# Patient Record
Sex: FEMALE | Race: WHITE | Employment: FULL TIME | URBAN - METROPOLITAN AREA
[De-identification: names, ages, dates, MRNs, and addresses within clinical notes are randomized per-mention and may not be internally consistent; named-entity substitution may affect disease eponyms.]

---

## 2022-07-20 ENCOUNTER — APPOINTMENT (OUTPATIENT)
Dept: GENERAL RADIOLOGY | Age: 37
End: 2022-07-20
Attending: EMERGENCY MEDICINE
Payer: MEDICAID

## 2022-07-20 ENCOUNTER — HOSPITAL ENCOUNTER (EMERGENCY)
Age: 37
Discharge: HOME | End: 2022-07-20
Attending: EMERGENCY MEDICINE
Payer: MEDICAID

## 2022-07-20 VITALS
HEIGHT: 67 IN | TEMPERATURE: 98.9 F | OXYGEN SATURATION: 98 % | DIASTOLIC BLOOD PRESSURE: 71 MMHG | WEIGHT: 200 LBS | HEART RATE: 98 BPM | BODY MASS INDEX: 31.39 KG/M2 | RESPIRATION RATE: 16 BRPM | SYSTOLIC BLOOD PRESSURE: 128 MMHG

## 2022-07-20 DIAGNOSIS — S83.92XA SPRAIN OF LEFT KNEE, UNSPECIFIED LIGAMENT, INITIAL ENCOUNTER: Primary | ICD-10-CM

## 2022-07-20 PROCEDURE — 74011250637 HC RX REV CODE- 250/637: Performed by: EMERGENCY MEDICINE

## 2022-07-20 PROCEDURE — 74011250636 HC RX REV CODE- 250/636: Performed by: EMERGENCY MEDICINE

## 2022-07-20 PROCEDURE — 96372 THER/PROPH/DIAG INJ SC/IM: CPT

## 2022-07-20 PROCEDURE — 99284 EMERGENCY DEPT VISIT MOD MDM: CPT

## 2022-07-20 PROCEDURE — 73562 X-RAY EXAM OF KNEE 3: CPT

## 2022-07-20 RX ORDER — IBUPROFEN 800 MG/1
800 TABLET ORAL
Qty: 20 TABLET | Refills: 0 | Status: SHIPPED | OUTPATIENT
Start: 2022-07-20 | End: 2022-07-27

## 2022-07-20 RX ORDER — ACETAMINOPHEN 500 MG
1000 TABLET ORAL ONCE
Status: COMPLETED | OUTPATIENT
Start: 2022-07-20 | End: 2022-07-20

## 2022-07-20 RX ORDER — KETOROLAC TROMETHAMINE 30 MG/ML
60 INJECTION, SOLUTION INTRAMUSCULAR; INTRAVENOUS
Status: COMPLETED | OUTPATIENT
Start: 2022-07-20 | End: 2022-07-20

## 2022-07-20 RX ADMIN — ACETAMINOPHEN 1000 MG: 500 TABLET ORAL at 21:50

## 2022-07-20 RX ADMIN — KETOROLAC TROMETHAMINE 60 MG: 30 INJECTION, SOLUTION INTRAMUSCULAR; INTRAVENOUS at 21:50

## 2022-09-23 ENCOUNTER — HOSPITAL ENCOUNTER (EMERGENCY)
Age: 37
Discharge: HOME | End: 2022-09-23
Attending: EMERGENCY MEDICINE
Payer: MEDICAID

## 2022-09-23 VITALS
DIASTOLIC BLOOD PRESSURE: 96 MMHG | OXYGEN SATURATION: 100 % | WEIGHT: 190 LBS | BODY MASS INDEX: 29.82 KG/M2 | HEART RATE: 97 BPM | TEMPERATURE: 98.4 F | RESPIRATION RATE: 18 BRPM | SYSTOLIC BLOOD PRESSURE: 137 MMHG | HEIGHT: 67 IN

## 2022-09-23 DIAGNOSIS — J06.9 ACUTE UPPER RESPIRATORY INFECTION: Primary | ICD-10-CM

## 2022-09-23 LAB
FLUAV AG NPH QL IA: NEGATIVE
FLUBV AG NOSE QL IA: NEGATIVE
SARS-COV-2, COV2: NORMAL

## 2022-09-23 PROCEDURE — 99283 EMERGENCY DEPT VISIT LOW MDM: CPT

## 2022-09-23 PROCEDURE — U0005 INFEC AGEN DETEC AMPLI PROBE: HCPCS

## 2022-09-23 PROCEDURE — 74011636637 HC RX REV CODE- 636/637: Performed by: EMERGENCY MEDICINE

## 2022-09-23 PROCEDURE — 87804 INFLUENZA ASSAY W/OPTIC: CPT

## 2022-09-23 RX ORDER — ALBUTEROL SULFATE 90 UG/1
1 AEROSOL, METERED RESPIRATORY (INHALATION)
Qty: 6.7 G | Refills: 1 | Status: SHIPPED | OUTPATIENT
Start: 2022-09-23

## 2022-09-23 RX ORDER — PREDNISONE 20 MG/1
TABLET ORAL
Qty: 12 TABLET | Refills: 0 | Status: SHIPPED | OUTPATIENT
Start: 2022-09-23

## 2022-09-23 RX ORDER — PREDNISONE 20 MG/1
60 TABLET ORAL ONCE
Status: CANCELLED | OUTPATIENT
Start: 2022-09-23 | End: 2022-09-23

## 2022-09-23 RX ORDER — PREDNISONE 20 MG/1
60 TABLET ORAL ONCE
Status: COMPLETED | OUTPATIENT
Start: 2022-09-23 | End: 2022-09-23

## 2022-09-23 RX ORDER — AZITHROMYCIN 250 MG/1
TABLET, FILM COATED ORAL
Qty: 6 TABLET | Refills: 0 | Status: SHIPPED | OUTPATIENT
Start: 2022-09-23

## 2022-09-23 RX ADMIN — PREDNISONE 60 MG: 20 TABLET ORAL at 21:17

## 2022-09-24 NOTE — ED TRIAGE NOTES
Pt states sore throat, nasal congestion and cough x 2 days, reports annual URI, however \"children tested positive for flu last week\"

## 2022-09-24 NOTE — ED PROVIDER NOTES
EMERGENCY DEPARTMENT HISTORY AND PHYSICAL EXAM      Date: 9/23/2022  Patient Name: Gideon Rubin    History of Presenting Illness     Chief Complaint   Patient presents with    Cough    Sore Throat    Nasal Congestion       History Provided By: Patient    HPI: Gideon Rubin, 31 y.o. female   presents to the ED with cc of URI symptoms. Patient complains of nasal congestion and sore throat along with a productive cough of yellow sputum for last 2 days. Patient states that she was recently exposed by a family member with a positive \"flu\". No fever chills. No shortness of breath. No vomiting or diarrhea. No headache. No body ache. No obvious exposure to COVID-19. No OTC treatment. PCP: Michael Ghosh MD    No current facility-administered medications on file prior to encounter. No current outpatient medications on file prior to encounter. Past History     Past Medical History:  Past Medical History:   Diagnosis Date    History of seasonal allergies        Past Surgical History:  History reviewed. No pertinent surgical history. Family History:  History reviewed. No pertinent family history. Social History:  Social History     Tobacco Use    Smoking status: Every Day     Packs/day: 0.50     Types: Cigarettes    Smokeless tobacco: Never   Vaping Use    Vaping Use: Never used   Substance Use Topics    Drug use: Not Currently       Allergies:  No Known Allergies      Review of Systems   Review of Systems   Constitutional:  Negative for chills and fever. HENT:  Positive for rhinorrhea and sore throat. Eyes:  Negative for discharge. Respiratory:  Positive for cough. Negative for shortness of breath. Cardiovascular:  Negative for chest pain. Gastrointestinal:  Negative for abdominal pain and vomiting. Genitourinary:  Negative for dysuria. Musculoskeletal:  Negative for joint swelling. Skin:  Negative for rash. Neurological:  Negative for headaches.    Psychiatric/Behavioral: Negative for suicidal ideas. All other systems reviewed and are negative. Physical Exam   Physical Exam  Vitals and nursing note reviewed. Constitutional:       General: She is not in acute distress. Appearance: Normal appearance. She is well-developed. She is not ill-appearing or toxic-appearing. HENT:      Head: Normocephalic and atraumatic. Nose: No congestion. Mouth/Throat:      Mouth: Mucous membranes are moist.      Pharynx: Posterior oropharyngeal erythema present. No oropharyngeal exudate. Comments: No tonsillar enlargement. Uvula midline. No muffled voice. No stridor. Eyes:      Conjunctiva/sclera: Conjunctivae normal.   Cardiovascular:      Rate and Rhythm: Regular rhythm. Heart sounds: Normal heart sounds. Pulmonary:      Effort: Pulmonary effort is normal.      Breath sounds: Normal breath sounds. No wheezing or rhonchi. Abdominal:      General: Bowel sounds are normal.      Palpations: Abdomen is soft. Tenderness: There is no abdominal tenderness. Musculoskeletal:         General: No deformity. Cervical back: Neck supple. No rigidity. Lymphadenopathy:      Cervical: No cervical adenopathy. Skin:     General: Skin is warm and dry. Neurological:      General: No focal deficit present. Mental Status: She is alert.    Psychiatric:         Mood and Affect: Mood normal.       Diagnostic Study Results     Labs -     Recent Results (from the past 12 hour(s))   SARS-COV-2    Collection Time: 09/23/22  8:15 PM   Result Value Ref Range    SARS-CoV-2 by PCR Please find results under separate order     INFLUENZA A & B AG (RAPID TEST)    Collection Time: 09/23/22  8:15 PM   Result Value Ref Range    Influenza A Antigen Negative Negative      Influenza B Antigen Negative Negative         Radiologic Studies -   No orders to display     CT Results  (Last 48 hours)      None          CXR Results  (Last 48 hours)      None              Medical Decision Making I am the first provider for this patient. I reviewed the vital signs, available nursing notes, past medical history, past surgical history, family history and social history. Vital Signs-Reviewed the patient's vital signs. Patient Vitals for the past 12 hrs:   Temp Pulse Resp BP SpO2   09/23/22 2007 98.4 °F (36.9 °C) 97 18 (!) 137/96 100 %       Records Reviewed:     Provider Notes (Medical Decision Making):       ED Course:   Initial assessment performed. The patients presenting problems have been discussed, and they are in agreement with the care plan formulated and outlined with them. I have encouraged them to ask questions as they arise throughout their visit. PROCEDURES      Disposition: Condition stable   DC- Adult Discharges: All of the diagnostic tests were reviewed and questions answered. Diagnosis, care plan and treatment options were discussed. understand instructions and will follow up as directed. The patients results have been reviewed with them. They have been counseled regarding their diagnosis. The patient verbally convey understanding and agreement of the signs, symptoms, diagnosis, treatment and prognosis and additionally agrees to follow up as recommended. They also agree with the care-plan and convey that all of their questions have been answered. I have also put together some discharge instructions for them that include: 1) educational information regarding their diagnosis, 2) how to care for their diagnosis at home, as well a 3) list of reasons why they would want to return to the ED prior to their follow-up appointment, should their condition change. PLAN:  1. Current Discharge Medication List        START taking these medications    Details   guaiFENesin-dextromethorphan SR (Mucinex DM) 600-30 mg per tablet Take 1 Tablet by mouth two (2) times a day.   Qty: 12 Tablet, Refills: 0  Start date: 9/23/2022      predniSONE (DELTASONE) 20 mg tablet 3 tablets for 2 days then 2 tablets for 2 days then 1 tablet for 2 day  Qty: 12 Tablet, Refills: 0  Start date: 9/23/2022      azithromycin (Zithromax Z-Jamison) 250 mg tablet As instructed in the pack  Qty: 6 Tablet, Refills: 0  Start date: 9/23/2022      albuterol (PROVENTIL HFA, VENTOLIN HFA, PROAIR HFA) 90 mcg/actuation inhaler Take 1 Puff by inhalation every four (4) hours as needed for Wheezing. Qty: 6.7 g, Refills: 1  Start date: 9/23/2022           2. Follow-up Information       Follow up With Specialties Details Why Contact Info    Follow up with your primary care physician  Schedule an appointment as soon as possible for a visit in 3 days As needed           Return to ED if worse     Diagnosis     Clinical Impression:   1. Acute upper respiratory infection        Please note that this dictation was completed with Edvert, the computer voice recognition software. Quite often unanticipated grammatical, syntax, homophones, and other interpretive errors are inadvertently transcribed by the computer software. Please disregard these errors. Please excuse any errors that have escaped final proofreading. Thank you.

## 2022-09-25 LAB
SARS-COV-2, XPLCVT: NOT DETECTED
SOURCE, COVRS: NORMAL

## 2022-11-02 ENCOUNTER — HOSPITAL ENCOUNTER (EMERGENCY)
Age: 37
Discharge: HOME | End: 2022-11-02
Attending: EMERGENCY MEDICINE
Payer: MEDICAID

## 2022-11-02 VITALS
TEMPERATURE: 99.2 F | SYSTOLIC BLOOD PRESSURE: 113 MMHG | OXYGEN SATURATION: 97 % | DIASTOLIC BLOOD PRESSURE: 67 MMHG | WEIGHT: 190 LBS | RESPIRATION RATE: 18 BRPM | HEART RATE: 109 BPM | HEIGHT: 67 IN | BODY MASS INDEX: 29.82 KG/M2

## 2022-11-02 DIAGNOSIS — B34.9 VIRAL SYNDROME: Primary | ICD-10-CM

## 2022-11-02 DIAGNOSIS — J02.9 SORE THROAT: ICD-10-CM

## 2022-11-02 DIAGNOSIS — M79.10 MYALGIA: ICD-10-CM

## 2022-11-02 LAB
DEPRECATED S PYO AG THROAT QL EIA: NEGATIVE
FLUAV AG NPH QL IA: NEGATIVE
FLUBV AG NOSE QL IA: NEGATIVE
SARS-COV-2, COV2: NORMAL

## 2022-11-02 PROCEDURE — U0005 INFEC AGEN DETEC AMPLI PROBE: HCPCS

## 2022-11-02 PROCEDURE — 87880 STREP A ASSAY W/OPTIC: CPT

## 2022-11-02 PROCEDURE — 87804 INFLUENZA ASSAY W/OPTIC: CPT

## 2022-11-02 PROCEDURE — 87070 CULTURE OTHR SPECIMN AEROBIC: CPT

## 2022-11-02 PROCEDURE — 99283 EMERGENCY DEPT VISIT LOW MDM: CPT

## 2022-11-02 NOTE — ED PROVIDER NOTES
EMERGENCY DEPARTMENT HISTORY AND PHYSICAL EXAM      Date: 11/2/2022  Patient Name: Jenny Nguyen    History of Presenting Illness     Chief Complaint   Patient presents with    Sore Throat       History Provided By: Patient    HPI: Jenny Nguyen, 30 y.o. female presents to the ED with CC of fever, chills, sore throat and sick partner with similar symptoms. Patient denies SOB, chest pain, or any neurological symptoms. There are no other complaints, changes, or physical findings at this time. Past History     Past Medical History:  Past Medical History:   Diagnosis Date    History of seasonal allergies        Allergies:  No Known Allergies    Review of Systems   Vital signs and nursing notes reviewed  Review of Systems   Constitutional:  Positive for chills and fever. Negative for activity change, appetite change and fatigue. HENT:  Positive for sore throat. Negative for congestion and rhinorrhea. Respiratory: Negative. Negative for cough, shortness of breath and wheezing. Cardiovascular: Negative. Negative for chest pain and leg swelling. Gastrointestinal: Negative. Negative for abdominal pain, diarrhea, nausea and vomiting. Endocrine: Negative. Genitourinary:  Negative for dysuria, frequency and urgency. Musculoskeletal: Negative. Negative for arthralgias, joint swelling and myalgias. Skin: Negative. Negative for rash. Neurological: Negative. Negative for dizziness, weakness, light-headedness and headaches. Psychiatric/Behavioral: Negative. Physical Exam   There were no vitals taken for this visit. CONSTITUTIONAL: Alert, in no distress. Appears stated age. HEAD:  Normocephalic, atraumatic  HENT: Mildly erythematous posterior pharynx, no exudate  EYES: EOM intact. No conjunctival injection or scleral icterus  Neck:  Supple. No meningismus  RESP: Normal with no work of breathing, speaking in full sentences. CV: Well perfused.    NEURO: Alert with normal mentation, moving extremities spontaneously  PSYCH: Normal mood, normal affect      Medical Decision Making   Patient presents for COVID 19 testing with normal oxygen saturation and mild URI symptoms or COVID 19 exposure. COVID 19 testing was conducted along with influenza and step. The patient was given quarantine/isolation recommendations and agrees with the plan to be discharged home. They were provided instructions to return for difficulty breathing, chest pain, altered mentation, or any other new or worsening symptoms. ED Course:   Initial assessment performed. The patients presenting problems have been discussed, and they are in agreement with the care plan formulated and outlined with them. I have encouraged them to ask questions as they arise throughout their visit. New Results - Labs    Updated   Order    11/03/22 1709  SARS-COV-2   Collected: 11/02/22 1358  Final result  Specimen: Nasopharyngeal     Specimen source Please find results under separate order     SARS-CoV-2 Not detected           11/02/22 1411  SARS-COV-2   Collected: 11/02/22 1358  Final result  Specimen: Miscellaneous sample     SARS-CoV-2 by PCR Please find results under separate order            11/04/22 1004  CULTURE, THROAT   Collected: 11/02/22 1357  Final result  Specimen: Throat     Special Requests: No Special Requests     Culture result: Normal respiratory ravi/no beta strep isolated            11/02/22 1455  INFLUENZA A & B AG (RAPID TEST)   Collected: 11/02/22 1357  Final result  Specimen: Nasopharyngeal from Nasal washing     Influenza A Antigen Negative     Influenza B Antigen Negative            11/02/22 1433  STREP AG SCREEN, GROUP A   Collected: 11/02/22 1357  Final result  Specimen: Throat from Serum     Group A Strep Ag ID Negative               Critical Care Time: None    Disposition:  DISCHARGE NOTE:  The pt is ready for discharge.  The pt's signs, symptoms, diagnosis, and discharge instructions have been discussed and pt has conveyed their understanding. The pt is to follow up as recommended or return to ER should their symptoms worsen. Plan has been discussed and pt is in agreement. PLAN:  1. Discharge Medication List as of 11/2/2022  3:05 PM        2. Follow-up Information       Follow up With Specialties Details Why Nicholas Wilkins MD Lab, Internal Medicine Physician  As needed [facility] [place] [id number]  [phone]      [address] [facility] Emergency Medicine  If symptoms worsen [address] [place] [id number] [phone]          3. COVID Testing results will be called once available if positive. Patient should utilize Swirl to access results. 4. Take Tylenol or Ibuprofen as needed  5. Drink plenty of fluids  6. Return to ED if worse especially if any shortness of breath, chest pain or altered mentation. Diagnosis     Clinical Impression:   1. Viral syndrome    2. Sore throat    3. Myalgia        Please note that this dictation was completed with TeliApp, the computer voice recognition software. Quite often unanticipated grammatical, syntax, homophones, and other interpretive errors are inadvertently transcribed by the computer software. Please disregards these errors. Please excuse any errors that have escaped final proofreading.

## 2022-11-02 NOTE — Clinical Note
[address] EMERGENCY DEPARTMENT  [address] [address]  [phone]    Work/School Note    Date: 11/2/2022     To Whom It May concern:    Tawana James was evaluated by the following provider(s):  Attending Provider: Octavio Segura MD.   Vola Laith virus is suspected. Per the CDC guidelines we recommend home isolation until the following conditions are all met:    1. At least five days have passed since symptoms first appeared and/or had a close exposure,   2. After home isolation for five days, wearing a mask around others for the next five days,  3. At least 24 have passed since last fever without the use of fever-reducing medications and  4.  Symptoms (eg cough, shortness of breath) have improved      Sincerely,          Umang Schuler MD

## 2022-11-03 LAB
BACTERIA SPEC CULT: NORMAL
SARS-COV-2, XPLCVT: NOT DETECTED
SOURCE, COVRS: NORMAL
SPECIAL REQUESTS,SREQ: NORMAL

## 2022-11-20 ENCOUNTER — HOSPITAL ENCOUNTER (EMERGENCY)
Age: 37
Discharge: HOME | End: 2022-11-20
Attending: EMERGENCY MEDICINE
Payer: MEDICAID

## 2022-11-20 VITALS
TEMPERATURE: 98.3 F | WEIGHT: 200 LBS | RESPIRATION RATE: 18 BRPM | OXYGEN SATURATION: 100 % | HEIGHT: 67 IN | SYSTOLIC BLOOD PRESSURE: 128 MMHG | DIASTOLIC BLOOD PRESSURE: 88 MMHG | HEART RATE: 87 BPM | BODY MASS INDEX: 31.39 KG/M2

## 2022-11-20 DIAGNOSIS — B34.9 VIRAL ILLNESS: Primary | ICD-10-CM

## 2022-11-20 PROCEDURE — 74011250637 HC RX REV CODE- 250/637: Performed by: EMERGENCY MEDICINE

## 2022-11-20 PROCEDURE — 99283 EMERGENCY DEPT VISIT LOW MDM: CPT

## 2022-11-20 PROCEDURE — 74011000250 HC RX REV CODE- 250: Performed by: EMERGENCY MEDICINE

## 2022-11-20 RX ORDER — DEXAMETHASONE SODIUM PHOSPHATE 10 MG/ML
10 INJECTION INTRAMUSCULAR; INTRAVENOUS ONCE
Status: COMPLETED | OUTPATIENT
Start: 2022-11-20 | End: 2022-11-20

## 2022-11-20 RX ORDER — CETIRIZINE HYDROCHLORIDE 10 MG/1
10 TABLET ORAL DAILY
Qty: 30 TABLET | Refills: 0 | Status: SHIPPED | OUTPATIENT
Start: 2022-11-20

## 2022-11-20 RX ORDER — IBUPROFEN 600 MG/1
600 TABLET ORAL
Qty: 20 TABLET | Refills: 0 | Status: SHIPPED | OUTPATIENT
Start: 2022-11-20

## 2022-11-20 RX ORDER — LIDOCAINE HYDROCHLORIDE 20 MG/ML
15 SOLUTION OROPHARYNGEAL AS NEEDED
Status: DISCONTINUED | OUTPATIENT
Start: 2022-11-20 | End: 2022-11-20

## 2022-11-20 RX ORDER — LIDOCAINE HYDROCHLORIDE 20 MG/ML
15 SOLUTION OROPHARYNGEAL ONCE
Status: COMPLETED | OUTPATIENT
Start: 2022-11-20 | End: 2022-11-20

## 2022-11-20 RX ORDER — DEXTROMETHORPHAN HYDROBROMIDE AND GUAIFENESIN 20; 200 MG/20ML; MG/20ML
10 SOLUTION ORAL
Qty: 118 ML | Refills: 0 | Status: SHIPPED | OUTPATIENT
Start: 2022-11-20

## 2022-11-20 RX ADMIN — DEXAMETHASONE SODIUM PHOSPHATE 10 MG: 10 INJECTION INTRAMUSCULAR; INTRAVENOUS at 20:08

## 2022-11-20 RX ADMIN — Medication 15 ML: at 20:08

## 2022-11-21 NOTE — ED PROVIDER NOTES
EMERGENCY DEPARTMENT HISTORY AND PHYSICAL EXAM      Date: 11/20/2022  Patient Name: Toro Mcknight    History of Presenting Illness     Chief Complaint   Patient presents with    Cough    Sore Throat       History Provided By: Patient    HPI: Toro Mcknight, 30 y.o. female presents to the ED with CC of sore throat, cough x2 days patient with no known sick contacts. Has not taken anything for symptoms prior to arrival to the emergency department. Reports mild associated bilateral ear pain and neck swelling. Patient denies SOB, chest pain, or any neurological symptoms. There are no other complaints, changes, or physical findings at this time. Past History     Past Medical History:  Past Medical History:   Diagnosis Date    History of seasonal allergies        Allergies:  No Known Allergies    Review of Systems   Vital signs and nursing notes reviewed  Review of Systems   Constitutional:  Negative for chills and fever. HENT:  Positive for congestion and sore throat. Eyes:  Negative for pain and visual disturbance. Respiratory:  Positive for cough. Negative for shortness of breath. Cardiovascular:  Negative for chest pain and palpitations. Gastrointestinal:  Negative for constipation, diarrhea, nausea and vomiting. Genitourinary:  Negative for dysuria and frequency. Musculoskeletal:  Positive for myalgias. Negative for arthralgias. Skin:  Negative for color change and rash. Neurological:  Negative for dizziness, weakness, light-headedness and headaches. Psychiatric/Behavioral:  Negative for dysphoric mood and sleep disturbance. Physical Exam   Visit Vitals  /88   Pulse 87   Temp 98.3 °F (36.8 °C)   Resp 18   Ht 5' 7\" (1.702 m)   Wt 90.7 kg (200 lb)   SpO2 100%   BMI 31.32 kg/m²     CONSTITUTIONAL: Alert, in no distress. Appears stated age. HEAD:  Normocephalic, atraumatic  EYES: EOM intact. No conjunctival injection or scleral icterus  Neck:  Supple.  No meningismus  RESP: Normal with no work of breathing, speaking in full sentences. CV: Well perfused. NEURO: Alert with normal mentation, moving extremities spontaneously  PSYCH: Normal mood, normal affect      Medical Decision Making   Patient presents normal oxygen saturation and mild URI symptoms. Viral testing was not conducted. The patient was given quarantine/isolation recommendations and agrees with the plan to be discharged home. They were provided instructions to return for difficulty breathing, chest pain, altered mentation, or any other new or worsening symptoms. Patient discharged home with symptomatic therapies    ED Course:   Initial assessment performed. The patients presenting problems have been discussed, and they are in agreement with the care plan formulated and outlined with them. I have encouraged them to ask questions as they arise throughout their visit. Critical Care Time: None    Disposition:  DISCHARGE NOTE:  The pt is ready for discharge. The pt's signs, symptoms, diagnosis, and discharge instructions have been discussed and pt has conveyed their understanding. The pt is to follow up as recommended or return to ER should their symptoms worsen. Plan has been discussed and pt is in agreement. PLAN:  1. Current Discharge Medication List        START taking these medications    Details   ibuprofen (MOTRIN) 600 mg tablet Take 1 Tablet by mouth every six (6) hours as needed for Pain. Qty: 20 Tablet, Refills: 0  Start date: 11/20/2022      cetirizine (ZYRTEC) 10 mg tablet Take 1 Tablet by mouth daily. Qty: 30 Tablet, Refills: 0  Start date: 11/20/2022      guaiFENesin-dextromethorphan (Robitussin Cough-Chest Bandar DM) 5-50 mg/5 mL liqd oral solution Take 10 mL by mouth every four (4) hours as needed for Cough or Congestion. Qty: 118 mL, Refills: 0  Start date: 11/20/2022           2.    Follow-up Information       Follow up With Specialties Details Why Vishnu Cailin Lew MD Lab, Internal Medicine Physician Schedule an appointment as soon as possible for a visit   [address] [place] [phone]      [facility] Emergency Medicine  As needed, If symptoms worsen [address] [place] [id number] [phone]          4. Take Tylenol or Ibuprofen as needed  5. Drink plenty of fluids  6. Return to ED if worse especially if any shortness of breath, chest pain or altered mentation. Diagnosis     Clinical Impression:   1. Viral illness        Please note that this dictation was completed with Snapt, the [employer] voice recognition software. Quite often unanticipated grammatical, syntax, homophones, and other interpretive errors are inadvertently transcribed by the computer software. Please disregards these errors. Please excuse any errors that have escaped final proofreading.

## 2023-02-01 ENCOUNTER — HOSPITAL ENCOUNTER (EMERGENCY)
Age: 38
Discharge: HOME | End: 2023-02-01
Attending: FAMILY MEDICINE | Admitting: FAMILY MEDICINE
Payer: MEDICAID

## 2023-02-01 VITALS
BODY MASS INDEX: 31.39 KG/M2 | SYSTOLIC BLOOD PRESSURE: 111 MMHG | DIASTOLIC BLOOD PRESSURE: 71 MMHG | HEART RATE: 87 BPM | HEIGHT: 67 IN | OXYGEN SATURATION: 100 % | WEIGHT: 200 LBS | RESPIRATION RATE: 16 BRPM | TEMPERATURE: 99.2 F

## 2023-02-01 DIAGNOSIS — R11.2 NAUSEA AND VOMITING, UNSPECIFIED VOMITING TYPE: Primary | ICD-10-CM

## 2023-02-01 DIAGNOSIS — R51.9 ACUTE NONINTRACTABLE HEADACHE, UNSPECIFIED HEADACHE TYPE: ICD-10-CM

## 2023-02-01 PROCEDURE — 74011250637 HC RX REV CODE- 250/637

## 2023-02-01 PROCEDURE — 96375 TX/PRO/DX INJ NEW DRUG ADDON: CPT

## 2023-02-01 PROCEDURE — 96374 THER/PROPH/DIAG INJ IV PUSH: CPT

## 2023-02-01 PROCEDURE — 96361 HYDRATE IV INFUSION ADD-ON: CPT

## 2023-02-01 PROCEDURE — 74011250636 HC RX REV CODE- 250/636

## 2023-02-01 PROCEDURE — 99284 EMERGENCY DEPT VISIT MOD MDM: CPT

## 2023-02-01 RX ORDER — ONDANSETRON 4 MG/1
4 TABLET, ORALLY DISINTEGRATING ORAL
Status: COMPLETED | OUTPATIENT
Start: 2023-02-01 | End: 2023-02-01

## 2023-02-01 RX ORDER — ONDANSETRON 4 MG/1
4 TABLET, FILM COATED ORAL
Qty: 20 TABLET | Refills: 0 | Status: SHIPPED | OUTPATIENT
Start: 2023-02-01

## 2023-02-01 RX ORDER — BUTALBITAL, ACETAMINOPHEN AND CAFFEINE 50; 325; 40 MG/1; MG/1; MG/1
1 TABLET ORAL
Status: COMPLETED | OUTPATIENT
Start: 2023-02-01 | End: 2023-02-01

## 2023-02-01 RX ORDER — DIPHENHYDRAMINE HYDROCHLORIDE 50 MG/ML
25 INJECTION, SOLUTION INTRAMUSCULAR; INTRAVENOUS ONCE
Status: COMPLETED | OUTPATIENT
Start: 2023-02-01 | End: 2023-02-01

## 2023-02-01 RX ORDER — DEXAMETHASONE SODIUM PHOSPHATE 10 MG/ML
10 INJECTION INTRAMUSCULAR; INTRAVENOUS ONCE
Status: COMPLETED | OUTPATIENT
Start: 2023-02-01 | End: 2023-02-01

## 2023-02-01 RX ORDER — KETOROLAC TROMETHAMINE 30 MG/ML
30 INJECTION, SOLUTION INTRAMUSCULAR; INTRAVENOUS
Status: COMPLETED | OUTPATIENT
Start: 2023-02-01 | End: 2023-02-01

## 2023-02-01 RX ADMIN — DIPHENHYDRAMINE HYDROCHLORIDE 25 MG: 50 INJECTION, SOLUTION INTRAMUSCULAR; INTRAVENOUS at 18:27

## 2023-02-01 RX ADMIN — ONDANSETRON 4 MG: 4 TABLET, ORALLY DISINTEGRATING ORAL at 17:24

## 2023-02-01 RX ADMIN — BUTALBITAL, ACETAMINOPHEN, AND CAFFEINE 1 TABLET: 50; 325; 40 TABLET ORAL at 17:24

## 2023-02-01 RX ADMIN — SODIUM CHLORIDE 1000 ML: 9 INJECTION, SOLUTION INTRAVENOUS at 18:25

## 2023-02-01 RX ADMIN — DEXAMETHASONE SODIUM PHOSPHATE 10 MG: 10 INJECTION, SOLUTION INTRAMUSCULAR; INTRAVENOUS at 18:27

## 2023-02-01 RX ADMIN — KETOROLAC TROMETHAMINE 30 MG: 30 INJECTION, SOLUTION INTRAMUSCULAR; INTRAVENOUS at 18:27

## 2023-02-01 NOTE — ED TRIAGE NOTES
0130 today vomiting x 3 no blood, diarrhea, body aches, poor appetite and drinking fluids, daughter sick too same symptoms, laying down makes better.

## 2023-02-01 NOTE — Clinical Note
[address]  [address] [facility] [address]  [phone]    Work/School Note    Date: 2/1/2023    To Whom It May concern:    Boni Draper was seen and treated today in the emergency room by the following provider(s):  Attending Provider: Bianca Odom DO  Physician Assistant: Bruce Chinchilla PA-C. Boni Draper is excused from work/school on 2/1/2023 through 2/3/2023. She is medically clear to return to work/school on 2/4/2023.          Sincerely,          Carmelo Rodriguez PA-C

## 2023-02-01 NOTE — ED PROVIDER NOTES
[facility] EMERGENCY DEPARTMENT  EMERGENCY DEPARTMENT HISTORY AND PHYSICAL EXAM      Date: 2/1/2023  Patient Name: Kristen Cunha  MRN: [id number]  YOB: 1985  Date of evaluation: 2/1/2023  Provider: Gabrielle Wong PA-C   Note Started: 4:17 PM 2/1/23    HISTORY OF PRESENT ILLNESS     Chief Complaint   Patient presents with    Vomiting    Generalized Body Aches       History Provided By: Patient    HPI: Kristen Cunha, 30 y.o. female with history of IBS, presents for nausea/vomiting/diarrhea for less than one day. Patient states that at 1 am symptoms started. Denies any blood in vomit or diarrhea. She does work at a  and has been around a lot of sick kids lately, but felt extremely fatigued and wanted to come in. Headache began earlier today, she did not take any medication to help with symptoms. She has gotten headaches before in the past and feels like this is similar, no history of diagnosed migraines. Denies any vision changes, photophobia, aura, not \"thunderclap\" in nature, not sudden onset, no stiff neck. Denies any shortness of breath, difficulty breathing, abdominal pain, rash, night sweats, or chest pain. PAST MEDICAL HISTORY   Past Medical History:  Past Medical History:   Diagnosis Date    History of seasonal allergies     IBS (irritable bowel syndrome)        Past Surgical History:  History reviewed. No pertinent surgical history. Family History:  History reviewed. No pertinent family history.     Social History:  Social History     Tobacco Use    Smoking status: Every Day     Packs/day: 0.50     Types: Cigarettes    Smokeless tobacco: Never   Vaping Use    Vaping Use: Never used   Substance Use Topics    Alcohol use: Yes     Comment: occasional    Drug use: Not Currently       Allergies:  No Known Allergies    PCP: Michelet Alvarez MD    Current Meds:   Discharge Medication List as of 2/1/2023  7:09 PM        CONTINUE these medications which have NOT CHANGED    Details ibuprofen (MOTRIN) 600 mg tablet Take 1 Tablet by mouth every six (6) hours as needed for Pain., Normal, Disp-20 Tablet, R-0      cetirizine (ZYRTEC) 10 mg tablet Take 1 Tablet by mouth daily. , Normal, Disp-30 Tablet, R-0      guaiFENesin-dextromethorphan (Robitussin Cough-Chest Bandar DM) 5-50 mg/5 mL liqd oral solution Take 10 mL by mouth every four (4) hours as needed for Cough or Congestion. , Normal, Disp-118 mL, R-0             REVIEW OF SYSTEMS   Review of Systems   Constitutional:  Positive for fatigue. HENT:  Negative for congestion and sore throat. Respiratory:  Negative for cough and shortness of breath. Cardiovascular:  Negative for chest pain and palpitations. Gastrointestinal:  Positive for diarrhea, nausea and vomiting. Negative for abdominal pain. Genitourinary: Negative. Musculoskeletal:  Negative for back pain and neck pain. Neurological:  Positive for headaches. Negative for light-headedness. Positives and Pertinent negatives as per HPI. PHYSICAL EXAM     ED Triage Vitals [02/01/23 1534]   ED Encounter Vitals Group      /71      Pulse (Heart Rate) 87      Resp Rate 16      Temp 99.2 °F (37.3 °C)      Temp src       O2 Sat (%) 100 %      Weight 200 lb      Height 5' 7\"      Physical Exam  Vitals and nursing note reviewed. Constitutional:       General: She is not in acute distress. Appearance: Normal appearance. HENT:      Head: Normocephalic and atraumatic. Eyes:      Extraocular Movements: Extraocular movements intact. Pupils: Pupils are equal, round, and reactive to light. Cardiovascular:      Rate and Rhythm: Normal rate and regular rhythm. Pulmonary:      Effort: Pulmonary effort is normal.      Breath sounds: Normal breath sounds. Abdominal:      General: Abdomen is flat. Bowel sounds are normal. There is no distension. Palpations: Abdomen is soft. Tenderness: There is no abdominal tenderness.  There is no right CVA tenderness or left CVA tenderness. Neurological:      Mental Status: She is alert. GCS: GCS eye subscore is 4. GCS verbal subscore is 5. GCS motor subscore is 6. Cranial Nerves: Cranial nerves 2-12 are intact. Coordination: Coordination is intact. Gait: Gait is intact. SCREENINGS               No data recorded        LAB, EKG AND DIAGNOSTIC RESULTS   Labs:  No results found for this or any previous visit (from the past 12 hour(s)). Radiologic Studies:  Non-plain film images such as CT, Ultrasound and MRI are read by the radiologist. Plain radiographic images are visualized and preliminarily interpreted by the ED Provider with the below findings:    Interpretation per the Radiologist below, if available at the time of this note:  No results found. PROCEDURES   Unless otherwise noted below, none.   Performed by: Lio Tapia PA-C   Procedures      CRITICAL CARE TIME       ED COURSE and DIFFERENTIAL DIAGNOSIS/MDM   Vitals:    Vitals:    02/01/23 1534   BP: 111/71   Pulse: 87   Resp: 16   Temp: 99.2 °F (37.3 °C)   SpO2: 100%   Weight: 90.7 kg (200 lb)   Height: 5' 7\" (1.702 m)        Patient was given the following medications:  Medications   ondansetron (ZOFRAN ODT) tablet 4 mg (4 mg Oral Given 2/1/23 1724)   butalbital-acetaminophen-caffeine (FIORICET, ESGIC) -40 mg per tablet 1 Tablet (1 Tablet Oral Given 2/1/23 1724)   sodium chloride 0.9 % bolus infusion 1,000 mL (0 mL IntraVENous IV Completed 2/1/23 1920)   dexamethasone (PF) (DECADRON) 10 mg/mL injection 10 mg (10 mg IntraVENous Given 2/1/23 1827)   ketorolac (TORADOL) injection 30 mg (30 mg IntraVENous Given 2/1/23 1827)   diphenhydrAMINE (BENADRYL) injection 25 mg (25 mg IntraVENous Given 2/1/23 1827)       CONSULTS: (Who and What was discussed)  None     Chronic Conditions:   Social Determinants affecting Dx or Tx: None  Counseling:      Records Reviewed (source and summary of external notes): Nursing notes    CC/HPI Summary, DDx, ED Course, and Reassessment:     Patient presents with nausea and vomiting. Differential includes gastritis/GERD, pancreatitis, cholelithiasis, cholecystitis, hepatitis, renal pathology, gastroenteritis. Less likely ACS, infection such as UTI/PNA based on the story. On exam, patient non tender to abdominal palpation. She has not had an episode of vomiting or diarrhea since early this morning. Her daughter is also with her and is having very similar symptoms. Likely viral gastroenteritis and symptomatic management indicated. Patient presents with headache. DDx: migraine, cluster HA, tension HA, dehydration/lack of proper hydration, lack of proper sleep, stress. Less likely pseudotumor cerebri, SAH, ICH, cerebral dural venous thrombosis, or meningitis given the course, story and physical exam. Neuro exam with no abnormalities. Headache feels very similar to ones she has had in the past, after IV migraine cocktail feeling much better with complete resolution of symptoms. Discussed that if headaches continue she should see primary care doctor about migraines. ED Course as of 02/01/23 2135 Wed Feb 01, 2023   1856 After migraine cocktail, patient feeling much better. Discussed that she likely has viral enteritis and symptomatic management indicated. [CL]      ED Course User Index  [CL] Rukhsana Tyler PA-C       Disposition Considerations (Tests not done, Shared Decision Making, Pt Expectation of Test or Treatment.):      FINAL IMPRESSION     1. Nausea and vomiting, unspecified vomiting type    2. Acute nonintractable headache, unspecified headache type          DISPOSITION/PLAN   Discharged    Discharge Note: The patient is stable for discharge home. The signs, symptoms, diagnosis, and discharge instructions have been discussed, understanding conveyed, and agreed upon. The patient is to follow up as recommended or return to ER should their symptoms worsen.       PATIENT REFERRED TO:  Follow-up Information       Follow up With Specialties Details Why Contact Info    Dolores Ruiz MD Lab, Internal Medicine Physician   [id number] [place] [phone] [facility] Emergency Medicine   [address] [place] [phone]              DISCHARGE MEDICATIONS:  Discharge Medication List as of 2/1/2023  7:09 PM        START taking these medications    Details   ondansetron hcl (Zofran) 4 mg tablet Take 1 Tablet by mouth every eight (8) hours as needed for Nausea or Vomiting., Normal, Disp-20 Tablet, R-0           CONTINUE these medications which have NOT CHANGED    Details   ibuprofen (MOTRIN) 600 mg tablet Take 1 Tablet by mouth every six (6) hours as needed for Pain., Normal, Disp-20 Tablet, R-0      cetirizine (ZYRTEC) 10 mg tablet Take 1 Tablet by mouth daily. , Normal, Disp-30 Tablet, R-0      guaiFENesin-dextromethorphan (Robitussin Cough-Chest Bnadar DM) 5-50 mg/5 mL liqd oral solution Take 10 mL by mouth every four (4) hours as needed for Cough or Congestion. , Normal, Disp-118 mL, R-0               DISCONTINUED MEDICATIONS:  Discharge Medication List as of 2/1/2023  7:09 PM          I am the Primary Clinician of Record: Carmelo Rodriguez PA-C (electronically signed)    (Please note that parts of this dictation were completed with voice recognition software. Quite often unanticipated grammatical, syntax, homophones, and other interpretive errors are inadvertently transcribed by the computer software. Please disregards these errors.  Please excuse any errors that have escaped final proofreading.)

## 2023-02-19 ENCOUNTER — HOSPITAL ENCOUNTER (EMERGENCY)
Age: 38
Discharge: HOME | End: 2023-02-19
Attending: EMERGENCY MEDICINE
Payer: MEDICAID

## 2023-02-19 VITALS
RESPIRATION RATE: 18 BRPM | DIASTOLIC BLOOD PRESSURE: 87 MMHG | OXYGEN SATURATION: 99 % | TEMPERATURE: 98.6 F | HEART RATE: 100 BPM | BODY MASS INDEX: 31.39 KG/M2 | WEIGHT: 200 LBS | HEIGHT: 67 IN | SYSTOLIC BLOOD PRESSURE: 108 MMHG

## 2023-02-19 DIAGNOSIS — J06.9 UPPER RESPIRATORY TRACT INFECTION, UNSPECIFIED TYPE: Primary | ICD-10-CM

## 2023-02-19 PROCEDURE — 99283 EMERGENCY DEPT VISIT LOW MDM: CPT

## 2023-02-19 RX ORDER — ALBUTEROL SULFATE 90 UG/1
1 AEROSOL, METERED RESPIRATORY (INHALATION)
Qty: 1 EACH | Refills: 0 | Status: SHIPPED | OUTPATIENT
Start: 2023-02-19 | End: 2023-02-26

## 2023-02-19 NOTE — ED TRIAGE NOTES
Started 4 days ago with cough yellow mucus and chest congestion, no n, v, diarrhea, no sob, no chest pain, taken vicks vapor cold/flu, didn't help, works at day care,

## 2023-02-19 NOTE — ED PROVIDER NOTES
[facility] EMERGENCY DEPARTMENT  EMERGENCY DEPARTMENT HISTORY AND PHYSICAL EXAM      Date: 2/19/2023  Patient Name: Nathan Gonzalez  MRN: [id number]  [place] 1985  Date of evaluation: 2/19/2023  Provider: Pati Estrella DO   Note Started: 10:32 AM 2/19/23  History of Presenting Illness     Chief Complaint   Patient presents with    Cough    Chest Congestion     History Provided By: Patient    HPI: Nathan Gonzalez, 30 y.o. female with history of irritable bowel syndrome and seasonal allergies who presents with cough, chest congestion, and wheezing x4 days. She does work at a . Denies difficulty breathing. No fevers. She has been using Tylenol cold and flu without improvement. There are no other complaints, changes, or physical findings at this time. Current Outpatient Medications   Medication Sig Dispense Refill    albuterol (PROVENTIL HFA, VENTOLIN HFA, PROAIR HFA) 90 mcg/actuation inhaler Take 1 Puff by inhalation every six (6) hours as needed for Wheezing or Shortness of Breath for up to 7 days. 1 Each 0    guaiFENesin (MUCINEX) 1,200 mg Ta12 ER tablet Take 1 Tablet by mouth two (2) times a day for 5 days. 10 Tablet 0    ondansetron hcl (Zofran) 4 mg tablet Take 1 Tablet by mouth every eight (8) hours as needed for Nausea or Vomiting. 20 Tablet 0    ibuprofen (MOTRIN) 600 mg tablet Take 1 Tablet by mouth every six (6) hours as needed for Pain. (Patient not taking: Reported on 2/1/2023) 20 Tablet 0    cetirizine (ZYRTEC) 10 mg tablet Take 1 Tablet by mouth daily. (Patient not taking: Reported on 2/1/2023) 30 Tablet 0    guaiFENesin-dextromethorphan (Robitussin Cough-Chest Bandar DM) 5-50 mg/5 mL liqd oral solution Take 10 mL by mouth every four (4) hours as needed for Cough or Congestion.  (Patient not taking: Reported on 2/1/2023) 118 mL 0     Past History   Past Medical History:  Past Medical History:   Diagnosis Date    History of seasonal allergies     IBS (irritable bowel syndrome)        Past Surgical History:  No past surgical history on file. Family History:  History reviewed. No pertinent family history. Social History:  Social History     Tobacco Use    Smoking status: Every Day     Packs/day: 0.50     Types: Cigarettes    Smokeless tobacco: Never   Vaping Use    Vaping Use: Never used   Substance Use Topics    Alcohol use: Yes     Comment: occasional    Drug use: Not Currently       Allergies:  No Known Allergies    PCP: Gwen Seth MD    Current Meds:   Previous Medications    CETIRIZINE (ZYRTEC) 10 MG TABLET    Take 1 Tablet by mouth daily. GUAIFENESIN-DEXTROMETHORPHAN (ROBITUSSIN COUGH-CHEST JOSE DM) 5-50 MG/5 ML LIQD ORAL SOLUTION    Take 10 mL by mouth every four (4) hours as needed for Cough or Congestion. IBUPROFEN (MOTRIN) 600 MG TABLET    Take 1 Tablet by mouth every six (6) hours as needed for Pain. ONDANSETRON HCL (ZOFRAN) 4 MG TABLET    Take 1 Tablet by mouth every eight (8) hours as needed for Nausea or Vomiting. Review of Systems   ROS  Review of Systems   Constitutional:  Negative for fever. HENT:  Positive for congestion. Eyes:  Negative for visual disturbance. Respiratory:  Positive for cough and wheezing. Negative for shortness of breath. Cardiovascular:  Negative for chest pain. Gastrointestinal:  Negative for abdominal pain. Genitourinary:  Negative for dysuria. Musculoskeletal:  Negative for arthralgias. Skin:  Negative for rash. Neurological:  Negative for headaches. Positives and pertinent negatives as per HPI. Physical Exam   Vitals  ED Triage Vitals   ED Encounter Vitals Group      BP       Pulse       Resp       Temp       Temp src       SpO2       Weight       Height       Exam  Constitutional: No acute distress. Well-nourished. Skin: No rash. ENT: No rhinorrhea. No cough. Head is normocephalic and atraumatic. Mild erythema to posterior oropharynx. Eye: No proptosis or conjunctival injections.   Respiratory: No apparent respiratory distress. Lung sounds are clear without wheezing. Gastrointestinal: Nondistended. Cardiac: Regular rate and rhythm. 2+ radial pulses. No murmurs. Musculoskeletal: No obvious bony deformities. Psychiatric: Cooperative. Appropriate mood and affect. SCREENINGS               No data recorded      Lab and Diagnostic Study Results   Labs -   No results found for this or any previous visit (from the past 12 hour(s)). EKG: Initial EKG interpreted by me. Interpretation:     Radiologic Studies:  Non-plain film images such as CT, Ultrasound and MRI are read by the radiologist. Plain radiographic images are visualized and preliminarily interpreted by the ED Provider with the below findings:      Interpretation per the radiologist below, if available at the time of this note:  No results found. [unfilled]  CT Results  (Last 48 hours)      None          CXR Results  (Last 48 hours)      None          MDM (EMERGENCY DEPARTMENT COURSE and DIFFERENTIAL DIAGNOSIS)   - I am the first and primary provider for this patient AND AM THE PRIMARY PROVIDER OF RECORD. I reviewed the vital signs, available nursing notes, past medical history, past surgical history, family history and social history. - Initial assessment performed. The patients presenting problems have been discussed, and the staff are in agreement with the care plan formulated and outlined with them. I have encouraged them to ask questions as they arise throughout their visit.     Vitals:    Vitals:    02/19/23 1038   BP: 108/87   Pulse: 100   Resp: 18   Temp: 98.6 °F (37 °C)   SpO2: 99%   Weight: 90.7 kg (200 lb)   Height: 5' 7\" (1.702 m)        Patient was given the following medications:  Medications - No data to display    CONSULTS: (who and what was discussed)  None       Chronic Conditions:   Past Medical History:   Diagnosis Date    History of seasonal allergies     IBS (irritable bowel syndrome)        Social Determinants affecting Dx or Tx:   Counseling:     Records Reviewed (source and summary of external notes):     CC/HPI Summary: Presented with cough, congestion, and wheezing. DDx: URI, influenza, COVID-19, pneumonia  ED Course and Reassessment:   Suspect likely a URI. I do not think a chest x-ray or further testing would be necessary at this point. She has had 4 days of symptoms. Will prescribe albuterol inhaler since she complains of some wheezing at home. We will also prescribe Mucinex. Discharged in good condition with return precautions. Disposition/Plan   Disposition: Discharged    DISCHARGE PLAN:  1. Current Discharge Medication List        CONTINUE these medications which have NOT CHANGED    Details   ondansetron hcl (Zofran) 4 mg tablet Take 1 Tablet by mouth every eight (8) hours as needed for Nausea or Vomiting. Qty: 20 Tablet, Refills: 0      ibuprofen (MOTRIN) 600 mg tablet Take 1 Tablet by mouth every six (6) hours as needed for Pain. Qty: 20 Tablet, Refills: 0      cetirizine (ZYRTEC) 10 mg tablet Take 1 Tablet by mouth daily. Qty: 30 Tablet, Refills: 0      guaiFENesin-dextromethorphan (Robitussin Cough-Chest Bandar DM) 5-50 mg/5 mL liqd oral solution Take 10 mL by mouth every four (4) hours as needed for Cough or Congestion. Qty: 118 mL, Refills: 0           2. Follow-up Information       Follow up With Specialties Details Why Contact Info    [address] [facility] Emergency Medicine Go today As soon as possible if symptoms worsen [address] [place] [phone]          3. Return to ED if worse   4. Current Discharge Medication List        START taking these medications    Details   albuterol (PROVENTIL HFA, VENTOLIN HFA, PROAIR HFA) 90 mcg/actuation inhaler Take 1 Puff by inhalation every six (6) hours as needed for Wheezing or Shortness of Breath for up to 7 days.   Qty: 1 Each, Refills: 0  Start date: 2/19/2023, End date: 2/26/2023      guaiFENesin (MUCINEX) 1,200 mg Ta12 ER tablet Take 1 Tablet by mouth two (2) times a day for 5 days. Qty: 10 Tablet, Refills: 0  Start date: 2/19/2023, End date: 2/24/2023              Discharged    PATIENT REFERRED TO:  Follow-up Information       Follow up With Specialties Details Why Contact Info    [facility] Emergency Medicine Go today As soon as possible if symptoms worsen [address] [place] [phone]             DISCONTINUED MEDICATIONS:  Current Discharge Medication List        Clinical Impression   Clinical Impression:   1. Upper respiratory tract infection, unspecified type           Attestations:  Diana Thurston DO    I am the Primary Clinician of Record. Diana Thurston DO (electronically signed)    (Please note that parts of this dictation were completed with voice recognition software. Quite often unanticipated grammatical, syntax, homophones, and other interpretive errors are inadvertently transcribed by the computer software. Please disregards these errors.  Please excuse any errors that have escaped final proofreading.)

## 2023-02-26 ENCOUNTER — HOSPITAL ENCOUNTER (EMERGENCY)
Age: 38
Discharge: HOME | End: 2023-02-26
Payer: MEDICAID

## 2023-02-26 VITALS
HEIGHT: 67 IN | WEIGHT: 200 LBS | TEMPERATURE: 98 F | OXYGEN SATURATION: 99 % | BODY MASS INDEX: 31.39 KG/M2 | HEART RATE: 97 BPM | RESPIRATION RATE: 18 BRPM | SYSTOLIC BLOOD PRESSURE: 121 MMHG | DIASTOLIC BLOOD PRESSURE: 73 MMHG

## 2023-02-26 DIAGNOSIS — R05.1 ACUTE COUGH: ICD-10-CM

## 2023-02-26 DIAGNOSIS — H93.8X1 CONGESTION OF RIGHT EAR: ICD-10-CM

## 2023-02-26 DIAGNOSIS — J02.9 SORE THROAT: Primary | ICD-10-CM

## 2023-02-26 PROCEDURE — 99283 EMERGENCY DEPT VISIT LOW MDM: CPT

## 2023-02-26 RX ORDER — HYDROCODONE POLISTIREX AND CHLORPHENIRAMINE POLISTIREX 10; 8 MG/5ML; MG/5ML
5 SUSPENSION, EXTENDED RELEASE ORAL
Qty: 70 ML | Refills: 0 | Status: SHIPPED | OUTPATIENT
Start: 2023-02-26 | End: 2023-03-05

## 2023-02-26 RX ORDER — PENICILLIN V POTASSIUM 500 MG/1
500 TABLET, FILM COATED ORAL 4 TIMES DAILY
Qty: 28 TABLET | Refills: 0 | Status: SHIPPED | OUTPATIENT
Start: 2023-02-26 | End: 2023-03-05

## 2023-02-26 RX ORDER — PREDNISONE 20 MG/1
20 TABLET ORAL DAILY
Qty: 10 TABLET | Refills: 0 | Status: SHIPPED | OUTPATIENT
Start: 2023-02-26 | End: 2023-03-08

## 2023-02-26 NOTE — LETTER
NOTIFICATION RETURN TO WORK / SCHOOL    2/26/2023 8:12 PM    Ms. New Hall  [place] [phone]      To Whom It May Concern:    New Hall is currently under the care of [address] [place] [address]. She will return to work/school on: 3/1/2023    If there are questions or concerns please have the patient contact our office.         Sincerely,              Franky Whitney RN

## 2023-02-27 NOTE — DISCHARGE INSTRUCTIONS
Tylenol ES 1-2 orally every 4 hrs for pain. Aleve 2 orally every 12 hrs for pain. Cepacol extra-strength lozenges &/or 1-2 teaspoon honey as needed for symptomatic treatment of your sore throat. Avoid any salty or citrus-containing food/fluids. Ice chips and cool fluids and food to soothe the throat.

## 2023-02-27 NOTE — ED PROVIDER NOTES
[facility] EMERGENCY DEPARTMENT  EMERGENCY DEPARTMENT HISTORY AND PHYSICAL EXAM      Date: 2/26/2023  Patient Name: Glen White  MRN: [id number]  YOB: 1985  Date of evaluation: 2/26/2023  Provider: Chidi Dietz NP   Note Started: 7:56 PM 2/26/23    HISTORY OF PRESENT ILLNESS     Chief Complaint   Patient presents with    Sore Throat    Cough       History Provided By: Patient    HPI: Glen White, 30 y.o. female presented to the ED complaining of a sore throat and cough for the past 1 week. Endorses intermittent chills, cough, congestion, and fever. She states she is unable to sleep at night due to the cough. She has taken over the counter medications with no symptom improvement. PAST MEDICAL HISTORY   Past Medical History:  Past Medical History:   Diagnosis Date    History of seasonal allergies     IBS (irritable bowel syndrome)        Past Surgical History:  History reviewed. No pertinent surgical history. Family History:  History reviewed. No pertinent family history. Social History:  Social History     Tobacco Use    Smoking status: Every Day     Packs/day: 0.50     Types: Cigarettes    Smokeless tobacco: Never   Vaping Use    Vaping Use: Never used   Substance Use Topics    Alcohol use: Yes     Comment: occasional    Drug use: Not Currently       Allergies:  No Known Allergies    PCP: Matilde Clements MD    Current Meds:   Previous Medications    ALBUTEROL (PROVENTIL HFA, VENTOLIN HFA, PROAIR HFA) 90 MCG/ACTUATION INHALER    Take 1 Puff by inhalation every six (6) hours as needed for Wheezing or Shortness of Breath for up to 7 days. REVIEW OF SYSTEMS   Review of Systems   Constitutional:  Positive for activity change, chills, diaphoresis and fatigue. Negative for fever. HENT:  Positive for congestion, rhinorrhea and sore throat. Respiratory:  Positive for cough, chest tightness and wheezing. Cardiovascular: Negative. Gastrointestinal: Negative.     Positives and Pertinent negatives as per HPI. PHYSICAL EXAM     ED Triage Vitals [02/26/23 1930]   ED Encounter Vitals Group      /73      Pulse (Heart Rate) 97      Resp Rate 18      Temp 98 °F (36.7 °C)      Temp src       O2 Sat (%) 99 %      Weight 200 lb      Height 5' 7\"      Physical Exam  Vitals and nursing note reviewed. Constitutional:       Appearance: She is well-developed and normal weight. HENT:      Right Ear: Tympanic membrane and ear canal normal. No drainage, swelling or tenderness. Left Ear: Tympanic membrane and ear canal normal. No drainage, swelling or tenderness. Nose: Congestion present. Mouth/Throat:      Mouth: Mucous membranes are moist.      Pharynx: Uvula midline. Pharyngeal swelling and posterior oropharyngeal erythema present. No uvula swelling. Tonsils: No tonsillar exudate. Cardiovascular:      Rate and Rhythm: Normal rate and regular rhythm. Pulmonary:      Effort: Pulmonary effort is normal.      Breath sounds: Normal breath sounds. Neurological:      Mental Status: She is alert. ED COURSE and DIFFERENTIAL DIAGNOSIS/MDM   Vitals:    Vitals:    02/26/23 1930   BP: 121/73   Pulse: 97   Resp: 18   Temp: 98 °F (36.7 °C)   SpO2: 99%   Weight: 90.7 kg (200 lb)   Height: 5' 7\" (1.702 m)        Patient was given the following medications:  Medications - No data to display    CONSULTS: (Who and What was discussed)  None     Chronic Conditions: See above  Social Determinants affecting Dx or Tx: None  Counseling:      Records Reviewed (source and summary of external notes): Nursing notes    CC/HPI Summary, DDx, ED Course, and Reassessment: The patient complains of sore throat and cough. Has non-productive cough without dyspnea or wheezing. Symptoms are consistent with an uncomplicated viral pharyngitis. DDx: strep throat, candidal infection. No evidence of peritonsillar abscess or retropharyngeal abscess.       Will treat with supportive care such as fluids, chloroseptic spray, analgesics. Please take antibiotics as prescribed. Symptomatic therapy suggested: gargle for sore throat, use mist at bedside for congestion. Apply facial warm packs for sinus pain or use nasal saline sprays. Follow up prn if not better in 72 hours. FINAL IMPRESSION     1. Sore throat    2. Acute cough    3. Congestion of right ear          DISPOSITION/PLAN   Discharged    Discharge Note: The patient is stable for discharge home. The signs, symptoms, diagnosis, and discharge instructions have been discussed, understanding conveyed, and agreed upon. The patient is to follow up as recommended or return to ER should their symptoms worsen. PATIENT REFERRED TO:  Follow-up Information       Follow up With Specialties Details Why Tiny Ellsworth MD Lab, Internal Medicine Physician  If symptoms worsen [address] [phone]      [address] [facility]  If symptoms worsen [address] [facility] [id number] [phone]              DISCHARGE MEDICATIONS:  Current Discharge Medication List        START taking these medications    Details   predniSONE (DELTASONE) 20 mg tablet Take 1 Tablet by mouth daily for 10 days. With Breakfast  Qty: 10 Tablet, Refills: 0  Start date: 2/26/2023, End date: 3/8/2023      HYDROcodone-chlorpheniramine (TUSSIONEX) 10-8 mg/5 mL suspension Take 5 mL by mouth every twelve (12) hours as needed for Cough for up to 7 days. Max Daily Amount: 10 mL. Qty: 70 mL, Refills: 0  Start date: 2/26/2023, End date: 3/5/2023    Associated Diagnoses: Acute cough      benzocaine-menthoL (Cepacol Sore Throat, carlyn-men,) 15-2.6 mg lozg lozenge Take 1 Lozenge by mouth every two (2) hours as needed for Sore throat. Qty: 48 Lozenge, Refills: 0  Start date: 2/26/2023               DISCONTINUED MEDICATIONS:  Current Discharge Medication List          I am the Primary Clinician of Record:  Keanu Dwyer NP (electronically signed)    (Please note that parts of this dictation were completed with voice recognition software. Quite often unanticipated grammatical, syntax, homophones, and other interpretive errors are inadvertently transcribed by the computer software. Please disregards these errors.  Please excuse any errors that have escaped final proofreading.)

## 2023-06-17 ENCOUNTER — HOSPITAL ENCOUNTER (EMERGENCY)
Facility: HOSPITAL | Age: 38
Discharge: HOME | End: 2023-06-17
Attending: FAMILY MEDICINE
Payer: MEDICAID

## 2023-06-17 VITALS
WEIGHT: 200 LBS | SYSTOLIC BLOOD PRESSURE: 120 MMHG | OXYGEN SATURATION: 97 % | HEIGHT: 67 IN | BODY MASS INDEX: 31.39 KG/M2 | TEMPERATURE: 98.3 F | DIASTOLIC BLOOD PRESSURE: 78 MMHG | RESPIRATION RATE: 16 BRPM | HEART RATE: 84 BPM

## 2023-06-17 DIAGNOSIS — S39.012A STRAIN OF LUMBAR PARASPINOUS MUSCLE, INITIAL ENCOUNTER: Primary | ICD-10-CM

## 2023-06-17 PROCEDURE — 6360000002 HC RX W HCPCS: Performed by: FAMILY MEDICINE

## 2023-06-17 PROCEDURE — 99284 EMERGENCY DEPT VISIT MOD MDM: CPT | Performed by: FAMILY MEDICINE

## 2023-06-17 PROCEDURE — 96372 THER/PROPH/DIAG INJ SC/IM: CPT | Performed by: FAMILY MEDICINE

## 2023-06-17 RX ORDER — CYCLOBENZAPRINE HCL 10 MG
10 TABLET ORAL 3 TIMES DAILY PRN
Qty: 15 TABLET | Refills: 0 | Status: SHIPPED | OUTPATIENT
Start: 2023-06-17 | End: 2023-06-27

## 2023-06-17 RX ORDER — KETOROLAC TROMETHAMINE 30 MG/ML
30 INJECTION, SOLUTION INTRAMUSCULAR; INTRAVENOUS
Status: COMPLETED | OUTPATIENT
Start: 2023-06-17 | End: 2023-06-17

## 2023-06-17 RX ORDER — SENNOSIDES 8.6 MG
650 CAPSULE ORAL EVERY 8 HOURS PRN
Qty: 12 TABLET | Refills: 0 | Status: SHIPPED | OUTPATIENT
Start: 2023-06-17 | End: 2023-06-21

## 2023-06-17 RX ADMIN — KETOROLAC TROMETHAMINE 30 MG: 30 INJECTION, SOLUTION INTRAMUSCULAR; INTRAVENOUS at 10:15

## 2023-06-17 ASSESSMENT — PAIN - FUNCTIONAL ASSESSMENT: PAIN_FUNCTIONAL_ASSESSMENT: 0-10

## 2023-06-17 ASSESSMENT — PAIN SCALES - GENERAL: PAINLEVEL_OUTOF10: 6

## 2023-06-17 NOTE — ED PROVIDER NOTES
EMERGENCY DEPARTMENT HISTORY AND PHYSICAL EXAM      Date: 6/17/2023  Patient Name: Ameya Lan    History of Presenting Illness     Chief Complaint   Patient presents with    Lower Back Pain       History Provided By:     HPI: Ameya Lan, is a very pleasant 30 y.o. female presenting to the ED with a chief complaint of lower back pain. Patient states she recently lifted up a heavy suitcase and feels like she pulled something in her lower back. Pain is worse on either side of the lower back. No midline back pain. No numbness tingling or weakness in extremities. No saddle anesthesia. No fever    Denies any other symptoms at this time. PCP: Kitty Muñoz MD    No current facility-administered medications on file prior to encounter. Current Outpatient Medications on File Prior to Encounter   Medication Sig Dispense Refill    Benzocaine-Menthol (CEPACOL SORE THROAT) 15-2.6 MG LOZG lozenge Take 1 lozenge by mouth (Patient not taking: Reported on 6/17/2023)         Past History     Past Medical History:  Past Medical History:   Diagnosis Date    History of seasonal allergies     IBS (irritable bowel syndrome)        Past Surgical History:  History reviewed. No pertinent surgical history. Family History:  History reviewed. No pertinent family history. Social History:  Social History     Tobacco Use    Smoking status: Every Day     Packs/day: 0.50     Types: Cigarettes    Smokeless tobacco: Never   Substance Use Topics    Alcohol use: Not Currently    Drug use: Not Currently       Allergies:  No Known Allergies      Review of Systems     Negative unless otherwise stated in HPI    Physical Exam     Physical Exam  Constitutional:       Appearance: Normal appearance. HENT:      Head: Normocephalic and atraumatic. Nose: Nose normal.   Eyes:      Extraocular Movements: Extraocular movements intact. Pupils: Pupils are equal, round, and reactive to light.    Cardiovascular:      Rate and

## 2023-07-06 ENCOUNTER — HOSPITAL ENCOUNTER (EMERGENCY)
Facility: HOSPITAL | Age: 38
Discharge: HOME | End: 2023-07-06
Attending: EMERGENCY MEDICINE
Payer: MEDICAID

## 2023-07-06 VITALS
HEART RATE: 80 BPM | SYSTOLIC BLOOD PRESSURE: 130 MMHG | OXYGEN SATURATION: 96 % | HEIGHT: 67 IN | TEMPERATURE: 98.6 F | DIASTOLIC BLOOD PRESSURE: 84 MMHG | BODY MASS INDEX: 31.35 KG/M2 | WEIGHT: 199.74 LBS | RESPIRATION RATE: 14 BRPM

## 2023-07-06 DIAGNOSIS — K08.89 PAIN, DENTAL: Primary | ICD-10-CM

## 2023-07-06 PROCEDURE — 99284 EMERGENCY DEPT VISIT MOD MDM: CPT

## 2023-07-06 PROCEDURE — 6370000000 HC RX 637 (ALT 250 FOR IP): Performed by: EMERGENCY MEDICINE

## 2023-07-06 PROCEDURE — 96372 THER/PROPH/DIAG INJ SC/IM: CPT

## 2023-07-06 PROCEDURE — 6360000002 HC RX W HCPCS: Performed by: EMERGENCY MEDICINE

## 2023-07-06 RX ORDER — AMOXICILLIN 250 MG/1
500 CAPSULE ORAL
Status: COMPLETED | OUTPATIENT
Start: 2023-07-06 | End: 2023-07-06

## 2023-07-06 RX ORDER — KETOROLAC TROMETHAMINE 30 MG/ML
30 INJECTION, SOLUTION INTRAMUSCULAR; INTRAVENOUS ONCE
Status: COMPLETED | OUTPATIENT
Start: 2023-07-06 | End: 2023-07-06

## 2023-07-06 RX ORDER — AMOXICILLIN 500 MG/1
500 CAPSULE ORAL 2 TIMES DAILY
Qty: 20 CAPSULE | Refills: 0 | Status: SHIPPED | OUTPATIENT
Start: 2023-07-06 | End: 2023-07-16

## 2023-07-06 RX ORDER — TRAMADOL HYDROCHLORIDE 50 MG/1
50 TABLET ORAL
Status: DISCONTINUED | OUTPATIENT
Start: 2023-07-06 | End: 2023-07-07 | Stop reason: HOSPADM

## 2023-07-06 RX ADMIN — AMOXICILLIN 500 MG: 250 CAPSULE ORAL at 22:19

## 2023-07-06 RX ADMIN — LIDOCAINE HYDROCHLORIDE: 20 SOLUTION ORAL; TOPICAL at 21:45

## 2023-07-06 RX ADMIN — KETOROLAC TROMETHAMINE 30 MG: 30 INJECTION, SOLUTION INTRAMUSCULAR at 21:45

## 2023-07-06 ASSESSMENT — PAIN DESCRIPTION - DESCRIPTORS
DESCRIPTORS: SHARP
DESCRIPTORS: THROBBING

## 2023-07-06 ASSESSMENT — PAIN DESCRIPTION - ORIENTATION
ORIENTATION: RIGHT
ORIENTATION: RIGHT

## 2023-07-06 ASSESSMENT — PAIN SCALES - GENERAL
PAINLEVEL_OUTOF10: 6
PAINLEVEL_OUTOF10: 10
PAINLEVEL_OUTOF10: 9

## 2023-07-06 ASSESSMENT — PAIN DESCRIPTION - LOCATION
LOCATION: MOUTH
LOCATION: TEETH

## 2023-07-07 NOTE — DISCHARGE INSTRUCTIONS
Please use tylenol 1000 mg and motrin 600 mg every 6 hours (you can take either pill every 3 hours for pain) along with the dental balls provided. Take the antibiotics and see your dentist. Thank you.

## 2023-07-07 NOTE — ED TRIAGE NOTES
ED triage note: Ambulatory with a steady gait. Patient reports right lower tooth pain x 3 days.  has been alternating tylenol and ibuprofen without relief.  had left over keflex that she took today.

## 2023-07-07 NOTE — ED PROVIDER NOTES
[facility] EMERGENCY CTR  EMERGENCY DEPARTMENT ENCOUNTER      Pt Name: Stevie Ramirez  MRN: [id number]  [address] [facility] [place] 1985  Date of evaluation: 7/6/2023  Provider: Claudia Whipple MD    CHIEF COMPLAINT       Chief Complaint   Patient presents with    Dental Pain         HISTORY OF PRESENT ILLNESS   (Location/Symptom, Timing/Onset, Context/Setting, Quality, Duration, Modifying Factors, Severity)  Note limiting factors. Patient is a 29-year-old female with history of seasonal allergies, irritable bowel syndrome, dental caries who presents with right lower jaw pain. Patient reports pain began a few days ago, she has been using Tylenol Motrin for pain as needed with minimal relief. Denies any trouble breathing or swallowing. Denies any facial swelling. The history is provided by the patient and the spouse. Nursing Notes were reviewed. REVIEW OF SYSTEMS    Not Required     Review of Systems    PAST MEDICAL HISTORY     Past Medical History:   Diagnosis Date    History of seasonal allergies     IBS (irritable bowel syndrome)        SURGICAL HISTORY     No past surgical history on file. CURRENT MEDICATIONS       Discharge Medication List as of 7/6/2023 10:22 PM        CONTINUE these medications which have NOT CHANGED    Details   acetaminophen (TYLENOL 8 HOUR) 650 MG extended release tablet Take 1 tablet by mouth every 8 hours as needed for Pain, Disp-12 tablet, R-0Normal      Benzocaine-Menthol (CEPACOL SORE THROAT) 15-2.6 MG LOZG lozenge Take 1 lozenge by mouthHistorical Med             ALLERGIES     Patient has no known allergies. FAMILY HISTORY     No family history on file.      SOCIAL HISTORY       Social History     Socioeconomic History    Marital status: Single   Tobacco Use    Smoking status: Every Day     Packs/day: 0.50     Types: Cigarettes    Smokeless tobacco: Never   Substance and Sexual Activity    Alcohol use: Not Currently    Drug use: Not Currently       PHYSICAL EXAM     Vitals:

## 2024-02-27 NOTE — Clinical Note
[address] [place] [id number]  [phone]    Work/School Note    Date: 7/20/2022    To Whom It May concern:    Alka Robin was seen and treated today in the emergency room by the following provider(s):  Attending Provider: Iftikhar Herrera MD.      Alka Robin is excused from work/school on 7/20/2022 through 7/22/2022. She is medically clear to return to work/school on 7/23/2022.          Sincerely,          Patricia Zimmer MD Chronic  - Start spiriva as home incruse ellipta is not on formulary